# Patient Record
(demographics unavailable — no encounter records)

---

## 2024-11-06 NOTE — END OF VISIT
[FreeTextEntry3] : I, Dr. Armnado personally performed the evaluation and management (E/M) services , including all procedures, for this established patient who presents today with (a) new problem(s)/exacerbation of (an) existing condition(s). That E/M includes conducting the clinically appropriate interval history &/or exam, assessing all new/exacerbated conditions, and establishing a new plan of care. Today, my LIA, Humaira Rubio, was here to observe &/or participate in the visit & follow plan of care established by me.

## 2024-11-06 NOTE — PHYSICAL EXAM
[Midline] : trachea located in midline position [Normal] : no rashes [de-identified] : cerumen in the ear canals; once removed normal EACs

## 2024-11-06 NOTE — HISTORY OF PRESENT ILLNESS
[de-identified] : Patient last seen in January for an ear cleaning, comes in today with complaints of some ear pain and pressure with occasional clicking. He did treat himself with an oral antibiotic when he had some laryngitis after the covid shot. Those symptoms have improved but the ear persists.

## 2025-01-09 NOTE — PHYSICAL EXAM
[Midline] : trachea located in midline position [Normal] : no rashes [de-identified] : cerumen removed from the bilateral ear canal with curette , normal ear canals bilaterally othewise

## 2025-01-09 NOTE — END OF VISIT
[FreeTextEntry3] : I, Dr. Armando personally performed the evaluation and management (E/M) services , including all procedures, for this established patient who presents today with (a) new problem(s)/exacerbation of (an) existing condition(s). That E/M includes conducting the clinically appropriate interval history &/or exam, assessing all new/exacerbated conditions, and establishing a new plan of care. Today, my LIA, Humaira Rubio, was here to observe &/or participate in the visit & follow plan of care established by me.

## 2025-01-09 NOTE — ASSESSMENT
[FreeTextEntry1] : Patient follows up doing well cerumen impaction has a small perforation in his right ear repeat hearing test shows that his hearing is stable follow-up as needed.

## 2025-01-09 NOTE — HISTORY OF PRESENT ILLNESS
[de-identified] : Patient is having ear clogging bilaterally and needs an ear cleaning. He does not have any issues with ringing in the ears or dizziness. He has had some family complain about his hearing issues. He does not have any nasal congestion or runny nose right now

## 2025-02-20 NOTE — ASSESSMENT
[FreeTextEntry1] : Patient follows up no change in his hearing audiogram showed no change reassured symptoms will resolve continue with Flonase.

## 2025-02-20 NOTE — HISTORY OF PRESENT ILLNESS
[de-identified] : Patient started a new medication  few weeks ago for his bladder and he reports that it started to make him have nasal congestion and ear clogging. The ear clogging makes his hearing feel muffled, worse on the right, and he has had intermittent vertigo He started Flonase a few days ago and it has helped with the vertigo a little

## 2025-02-20 NOTE — REVIEW OF SYSTEMS
[As Noted in HPI] : as noted in HPI [Hearing Loss] : hearing loss [Vertigo] : vertigo [Negative] : Heme/Lymph

## 2025-04-02 NOTE — ASSESSMENT
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  I suspect that his symptoms are due to peripheral vestibular dysfunction and not vertebrobasilar insufficiency.  I will request a review of his MRI and MRA.  He will be referred for neuro otology consultation and vestibular therapy evaluation.  Further management will depend upon these results and his clinical course.

## 2025-04-02 NOTE — CONSULT LETTER
[Dear  ___] : Dear  [unfilled], [Consult Letter:] : I had the pleasure of evaluating your patient, [unfilled]. [Please see my note below.] : Please see my note below. [Consult Closing:] : Thank you very much for allowing me to participate in the care of this patient.  If you have any questions, please do not hesitate to contact me. [Sincerely,] : Sincerely, [DrIvan  ___] : Dr. ROSS [FreeTextEntry3] : Reid Tristan M.D.

## 2025-04-02 NOTE — PHYSICAL EXAM
[FreeTextEntry1] : Constitutional:  Patient was well-developed, well-nourished and in no acute distress.   Head: He had bilateral raccoon's eyes.  Tympanic membranes were clear.  Neck:  Supple with full range of motion.   Cardiovascular:  Cardiac rhythm was regular with occasional skipped beats without murmur. There were no carotid bruits. Peripheral pulses were full and symmetric.  Blood pressure was 160/70.  He was not orthostatic.  Respiratory:  Lungs were clear.   Abdomen:  Soft and nontender.   Spine:  Nontender.   Skin:  There were no rashes.   NEUROLOGICAL EXAMINATION:  Mental Status: Patient was alert and oriented. Speech was fluent. There was no dysarthria.   Cranial Nerves:   II: He could finger count bilaterally. Pupils were equal and reactive. Visual fields were full. Funduscopic examination was normal.   III, IV, VI:  Eye movements were full without nystagmus.  There was no ocular dysmetria.  On Nylen Barany maneuver, he experienced dizziness when sitting up from the right and left lateral positions with mild counterclockwise nystagmus.  There was no vertical nystagmus.  V: Facial sensation was intact.   VII: Facial strength was normal.   VIII: Hearing was equal.   IX, X: Palatal movement was normal. Phonation was normal.   XI: Sternocleidomastoids and trapezii were normal.   XII: Tongue was midline and movements normal. There was no lingual atrophy or fasciculations.   Motor Examination: Muscle bulk, tone and strength were normal.  There was no tremor.  Sensory Examination: Joint position sense was intact.  Vibration was mildly diminished in the toes.  There was shading pinprick in a stocking distribution.  Reflexes: DTRs were 2 throughout except for the left biceps and brachioradialis which were 1+.  The knee jerks were 2+.  Plantar Responses: Plantar responses were flexor.   Coordination/Cerebellar Function: There was no dysmetria on finger to nose or heel to shin testing.   Gait/Stance:   Gait was fairly stable.  He mildly swayed on Romberg testing.  Tandem was very unsteady.

## 2025-04-02 NOTE — HISTORY OF PRESENT ILLNESS
[FreeTextEntry1] :  Martinez Colette returned to the office having been last seen in 2019.  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  Electrodiagnostic studies revealed no evidence of sensorimotor polyneuropathy.  He was lost to follow-up.  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  This occurred 2 weeks after returning from [facility].  He experienced right ear fullness.  The symptoms gradually resolved after about a week.  He was evaluated by Dr. Musa Armando.  Audiometry revealed high-frequency right hearing loss.  He was treated with Flonase.  His symptoms improved.  He however reported momentary lightheadedness without vertigo when turning in bed.  He returned from [place] in mid March.  He reported recurrence of lightheadedness and right ear fullness particularly in the morning.  He complains of crackling in his right more than left ear.  At times, he was a bit unsteady.  On March 25, he walked for 3 miles.  He felt perfectly fine until he was approaching his home.  He suddenly felt as though he was unable to stop.  He fell on his face on the pavement.  He did not lose consciousness.  He was evaluated at [facility] where he was treated by a plastic surgeon.  CT imaging was notable for nasal fracture only.    When he paula from the CT table, he experienced fleeting vertigo. He reports mild episodic lightheadedness and imbalance.  He still has crackling in his ears but denies tinnitus or hearing loss.  He denies other neurologic symptoms.  MRI of the brain performed on March 27, 2025 revealed mild age-appropriate atrophy.  Upon my review, there appeared to be fluid in the right mastoid.  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  There was mild stenosis of the P1 and P2 segments of the right PCA.  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  MRA of the neck was unremarkable.  Past surgical history is notable for excision of basal cell carcinoma, tonsillectomy, cholecystectomy and bilateral inguinal herniorrhaphy.  He suffers from hyperlipidemia, prediabetes and peptic ulcer disease.  There is no history of hypertension, cardiac, pulmonary, renal, hepatic, thyroid, hematologic or cerebrovascular disease.  He has no drug allergies.  Medications include Cardura and atorvastatin 20 mg/day.  He is a non-smoker and social drinker.  He is  and is a retired physician.  Family history is notable for father with a spinal hemangioma and paraparesis, a mother with gallbladder disease and a brother with obesity.

## 2025-04-02 NOTE — HISTORY OF PRESENT ILLNESS
[FreeTextEntry1] :  Martinez Colette returned to the office having been last seen in 2019.  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  Electrodiagnostic studies revealed no evidence of sensorimotor polyneuropathy.  He was lost to follow-up.  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  This occurred 2 weeks after returning from [facility].  He experienced right ear fullness.  The symptoms gradually resolved after about a week.  He was evaluated by Dr. Musa Armando.  Audiometry revealed high-frequency right hearing loss.  He was treated with Flonase.  His symptoms improved.  He however reported momentary lightheadedness without vertigo when turning in bed.  He returned from [place] in mid March.  He reported recurrence of lightheadedness and right ear fullness particularly in the morning.  He complains of crackling in his right more than left ear.  At times, he was a bit unsteady.  On March 25, he walked for 3 miles.  He felt perfectly fine until he was approaching his home.  He suddenly felt as though he was unable to stop.  He fell on his face on the pavement.  He did not lose consciousness.  He was evaluated at [facility] where he was treated by a plastic surgeon.  CT imaging was notable for nasal fracture only.    When he paula from the CT table, he experienced fleeting vertigo. He reports mild episodic lightheadedness and imbalance.  He still has crackling in his ears but denies tinnitus or hearing loss.  He denies other neurologic symptoms.  MRI of the brain performed on March 27, 2025 revealed mild age-appropriate atrophy.  Upon my review, there appeared to be fluid in the right mastoid.  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  There was mild stenosis of the P1 and P2 segments of the right PCA.  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  MRA of the neck was unremarkable.  Past surgical history is notable for excision of basal cell carcinoma, tonsillectomy, cholecystectomy and bilateral inguinal herniorrhaphy.  He suffers from hyperlipidemia, prediabetes and peptic ulcer disease.  There is no history of hypertension, cardiac, pulmonary, renal, hepatic, thyroid, hematologic or cerebrovascular disease.  He has no drug allergies.  Medications include Cardura and atorvastatin 20 mg/day.  He is a non-smoker and social drinker.  He is  and is a retired physician.  Family history is notable for father with a spinal hemangioma and paraparesis, a mother with gallbladder disease and a brother with obesity.